# Patient Record
Sex: FEMALE | Race: WHITE | HISPANIC OR LATINO | URBAN - METROPOLITAN AREA
[De-identification: names, ages, dates, MRNs, and addresses within clinical notes are randomized per-mention and may not be internally consistent; named-entity substitution may affect disease eponyms.]

---

## 2018-12-13 ENCOUNTER — EMERGENCY (EMERGENCY)
Facility: HOSPITAL | Age: 23
LOS: 0 days | Discharge: HOME | End: 2018-12-13
Admitting: PHYSICIAN ASSISTANT

## 2018-12-13 VITALS
SYSTOLIC BLOOD PRESSURE: 128 MMHG | OXYGEN SATURATION: 97 % | TEMPERATURE: 98 F | HEART RATE: 64 BPM | DIASTOLIC BLOOD PRESSURE: 71 MMHG | RESPIRATION RATE: 20 BRPM
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
## 2018-12-13 DIAGNOSIS — S61.213A LACERATION WITHOUT FOREIGN BODY OF LEFT MIDDLE FINGER WITHOUT DAMAGE TO NAIL, INITIAL ENCOUNTER: ICD-10-CM
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
## 2018-12-13 DIAGNOSIS — Z23 ENCOUNTER FOR IMMUNIZATION: ICD-10-CM
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
## 2018-12-13 RX ORDER — TETANUS TOXOID, REDUCED DIPHTHERIA TOXOID AND ACELLULAR PERTUSSIS VACCINE, ADSORBED 5; 2.5; 8; 8; 2.5 [IU]/.5ML; [IU]/.5ML; UG/.5ML; UG/.5ML; UG/.5ML
0.5 SUSPENSION INTRAMUSCULAR ONCE
Qty: 0 | Refills: 0 | Status: COMPLETED | OUTPATIENT
Start: 2018-12-13 | End: 2018-12-13

## 2018-12-13 RX ADMIN — TETANUS TOXOID, REDUCED DIPHTHERIA TOXOID AND ACELLULAR PERTUSSIS VACCINE, ADSORBED 0.5 MILLILITER(S): 5; 2.5; 8; 8; 2.5 SUSPENSION INTRAMUSCULAR at 13:06

## 2018-12-13 NOTE — ED PROVIDER NOTE - SKIN, MLM
(+) 1.5cm laceration dorsal medial aspect Left middle finger over PIP joint, FROM, no motor or sensory deficit, cap refill < 2 sec

## 2018-12-13 NOTE — ED PROCEDURE NOTE - NS ED PERI VASCULAR NEG
no paresthesia/capillary refill time < 2 seconds/no swelling/no cyanosis of extremity/fingers/toes warm to touch

## 2018-12-13 NOTE — ED ADULT TRIAGE NOTE - CHIEF COMPLAINT QUOTE
pt presents with laceration to left hand middle finger s/p cutting finger with knife while cooking pt has decreased ROM

## 2018-12-13 NOTE — ED PROVIDER NOTE - MEDICAL DECISION MAKING DETAILS
proper wound care discussed, pt made aware and understands s&s of infection; finger splint x 2days; suture removal 10-12 days; pt will follow up with PCP in 2-3 days; pt will return to ER.   Note complete

## 2018-12-13 NOTE — ED PROVIDER NOTE - OBJECTIVE STATEMENT
23 y.o. female without any PMH presented to the ER c/o laceration Left middle finger after cutting it on knife that "slipped" while she was cleaning it.  Denies extremity weakness/paresthesias.  Tetanus not up to date.  No other complaints.  #[id number]

## 2018-12-13 NOTE — ED PROCEDURE NOTE - CPROC ED POST PROC CARE GUIDE1
Verbal/written post procedure instructions were given to patient/caregiver./Instructed patient/caregiver regarding signs and symptoms of infection./Keep the cast/splint/dressing clean and dry./Instructed patient/caregiver to follow-up with primary care physician./Elevate the injured extremity as instructed.
Verbal/written post procedure instructions were given to patient/caregiver./Instructed patient/caregiver regarding signs and symptoms of infection./Instructed patient/caregiver to follow-up with primary care physician./Keep the cast/splint/dressing clean and dry.

## 2018-12-29 ENCOUNTER — EMERGENCY (EMERGENCY)
Facility: HOSPITAL | Age: 23
LOS: 0 days | Discharge: HOME | End: 2018-12-29
Admitting: PHYSICIAN ASSISTANT

## 2018-12-29 VITALS
TEMPERATURE: 99 F | RESPIRATION RATE: 19 BRPM | SYSTOLIC BLOOD PRESSURE: 129 MMHG | OXYGEN SATURATION: 100 % | HEART RATE: 78 BPM | DIASTOLIC BLOOD PRESSURE: 59 MMHG

## 2018-12-29 DIAGNOSIS — S61.213D LACERATION WITHOUT FOREIGN BODY OF LEFT MIDDLE FINGER WITHOUT DAMAGE TO NAIL, SUBSEQUENT ENCOUNTER: ICD-10-CM
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
## 2018-12-29 DIAGNOSIS — S61.213S: ICD-10-CM

## 2018-12-29 NOTE — ED PROVIDER NOTE - OBJECTIVE STATEMENT
23 year old female with no pmhx presents for suture removal. sutures to 23 year old female with no pmhx presents for suture removal. sutures to left middle finger. no erythema or drainage

## 2018-12-29 NOTE — ED PROVIDER NOTE - NS ED ROS FT
Review of Systems:  	•	CONSTITUTIONAL - no fever, no diaphoresis, no chills  	•	SKIN - no rash  	•	MUSCULOSKELETAL - no joint paint, no swelling, no redness  	•	NEUROLOGIC -  no paresthesias

## 2018-12-29 NOTE — ED PROVIDER NOTE - PHYSICAL EXAMINATION
CONST: Well appearing in NAD  MS: Normal ROM.  SKIN: 3 sutures in place to left d  NEURO: Strength 5/5 with no sensory deficits. CONST: Well appearing in NAD  MS: Normal ROM.  SKIN: 3 sutures in place to left middle finger  NEURO: Strength 5/5 with no sensory deficits.